# Patient Record
Sex: MALE | Race: WHITE | NOT HISPANIC OR LATINO | Employment: OTHER | URBAN - METROPOLITAN AREA
[De-identification: names, ages, dates, MRNs, and addresses within clinical notes are randomized per-mention and may not be internally consistent; named-entity substitution may affect disease eponyms.]

---

## 2017-01-31 ENCOUNTER — PREPPED CHART (OUTPATIENT)
Dept: URBAN - METROPOLITAN AREA CLINIC 43 | Facility: CLINIC | Age: 82
End: 2017-01-31

## 2018-04-20 ENCOUNTER — ESTABLISHED COMPREHENSIVE EXAM (OUTPATIENT)
Dept: URBAN - METROPOLITAN AREA CLINIC 43 | Facility: CLINIC | Age: 83
End: 2018-04-20

## 2018-04-20 DIAGNOSIS — H35.372: ICD-10-CM

## 2018-04-20 DIAGNOSIS — H04.123: ICD-10-CM

## 2018-04-20 DIAGNOSIS — H43.813: ICD-10-CM

## 2018-04-20 PROCEDURE — 1036F TOBACCO NON-USER: CPT

## 2018-04-20 PROCEDURE — G8428 CUR MEDS NOT DOCUMENT: HCPCS

## 2018-04-20 PROCEDURE — 92015 DETERMINE REFRACTIVE STATE: CPT

## 2018-04-20 PROCEDURE — 92014 COMPRE OPH EXAM EST PT 1/>: CPT

## 2018-04-20 PROCEDURE — G8756 NO BP MEASURE DOC: HCPCS

## 2018-04-20 ASSESSMENT — VISUAL ACUITY
OD_SC: J3
OS_CC: 20/30-2
OS_CC: J1
OS_SC: 20/60
OD_SC: 20/50
OD_BAT: 20/80
OD_CC: J2
OD_CC: 20/30-1
OS_SC: J3
OS_BAT: 20/80

## 2018-04-20 ASSESSMENT — TONOMETRY
OD_IOP_MMHG: 15
OS_IOP_MMHG: 16

## 2018-11-05 NOTE — PATIENT DISCUSSION
CATARACT, OU -  QUESTION VISUALLY SIGNIFICANT OD&gt; OS. VA LIKELY ALSO LIMITED DUE TO THE RETINA. WILL REFER TO DR Bhavik Watts FOR FURTHER EVAL OF RETINA AND TO CLEAR FOR PHACO IF CAT SX WILL IMPROVE HER VISION.

## 2018-11-05 NOTE — PATIENT DISCUSSION
ERM, OD - CONSULT DR. INIGUEZ FOR FURTHER EVAL AND POSSIBLE TREATMENT. ALSO CLEAR FOR PHACO AND ADRESS IF REMOVING THE CATARACT WOULD BENEFIT HER VISION.

## 2018-11-15 NOTE — PATIENT DISCUSSION
New Prescription: Pred Forte (prednisolone acetate): drops,suspension: 1% 1 drop four times a day into left eye 11-

## 2018-11-15 NOTE — PATIENT DISCUSSION
New Prescription: atropine (atropine): drops: 1% 1 drop twice a day as directed into affected eye 11-

## 2018-11-15 NOTE — PATIENT DISCUSSION
RETINA IS ATTACHED OU: PVD OD; SYNERESIS OS; NO HOLES OR TEARS SEEN ON DILATED EXAM TODAY.  RETINAL DETACHMENT SIGNS AND SYMPTOMS REVIEWED

## 2018-11-15 NOTE — PATIENT DISCUSSION
New Prescription: ciprofloxacin (ciprofloxacin): drops: 0.3% 1 drop four times a day as directed into affected eye 11-

## 2018-12-12 NOTE — PATIENT DISCUSSION
Continue: Pred Forte (prednisolone acetate): drops,suspension: 1% 1 drop four times a day into left eye 11-

## 2018-12-20 NOTE — PATIENT DISCUSSION
REVIEWED PVD PRECAUTIONS WITH PATIENT AND ADVISED TO CALL IF EXPERIENCES NEW FLASHES OF LIGHT, INCREASE IN FLOATERS, OR DECREASE VISION IN EITHER EYE

## 2018-12-20 NOTE — PATIENT DISCUSSION
POST-OP WEEK 1 EXAM: S/P PPV/MP OD. Doing well today. Retina attached. IOP within acceptable limits. Continue eyedrops in the surgical eye as instructed. Begin Pred taper. Discontinue Cipro and Atropine. Retinal detachment and endophthalmitis precautions reviewed. Instructed to call immediately for worsening vision, eye pain, or eye discharge.

## 2018-12-20 NOTE — PATIENT DISCUSSION
Continue: Pred Forte (prednisolone acetate): drops,suspension: 1% 1 drop three times a day into left eye 11-

## 2019-01-10 NOTE — PATIENT DISCUSSION
POST-OP MONTH 1 EXAM: S/P PPV/MP OD. Doing well today. Retina attached. IOP within acceptable limits. Finish eyedrops in the surgical eye as instructed. Retinal detachment and endophthalmitis precautions reviewed. Instructed to call immediately for worsening vision, eye pain, or eye discharge.

## 2019-06-20 NOTE — PATIENT DISCUSSION
RETINA IS ATTACHED OU: S/P PPV/PEEL OD; NO HOLES OR TEARS SEEN ON DILATED EXAM TODAY.  RETINAL DETACHMENT SIGNS AND SYMPTOMS REVIEWED

## 2019-07-26 NOTE — PATIENT DISCUSSION
CATARACT, OU - VISUALLY SIGNIFICANT OD &gt; OS. SCHEDULE SX OD.  WILL HOLD OFF ON CATARACT SURGERY IN THE OS FOR NOW

## 2019-07-26 NOTE — PATIENT DISCUSSION
PATIENT UNDERSTANDS THAT HER VISION WILL ALWAYS BE LIMITED BY HER RETINA OD. PATIENT UNDERSTANDS INCREASED RISKS WITH CATARACT SURGERY DUE TO PREVIOUS RETINAL SURGERY.

## 2019-07-26 NOTE — PATIENT DISCUSSION
Surgery  Counseling: I have discussed the option of glasses versus cataract surgery versus following. It was explained that when vision no longer meets the patient's visual needs and a new prescription for glasses is not likely to improve the patient's visual symptoms, the option of cataract surgery is a reasonable next step. It was explained that there is no guarantee that removing the cataract will improve their visual symptoms, however; it is believed that the cataract is contributing to the patient's visual impairment and surgery may significantly improve both the visual and functional status of the patient. The risks, benefits and alternatives of surgery were discussed with the patient. After this discussion, the patient desires to proceed with cataract surgery with implantation of an intraocular lens to improve vision and reduce glare in the mornings.

## 2019-07-26 NOTE — PATIENT DISCUSSION
New Prescription: prednisolone acetate (prednisolone acetate): drops,suspension: 1% 1 drop four times a day as directed 07-

## 2019-09-12 NOTE — PATIENT DISCUSSION
S/P PCIOL, OD- STABLE, DOING WELL. OK TO PROCEED WITH FELLOW EYE SURGERY. PT REPORTS THAT THEY ARE HAPPY WITH THE OUTCOME OF THE OPERATIVE EYE AND READY TO PURSUE SX IN THE FELLOW EYE.

## 2019-09-12 NOTE — PATIENT DISCUSSION
Continue: prednisolone acetate (prednisolone acetate): drops,suspension: 1% 1 drop four times a day as directed 07-

## 2019-09-12 NOTE — PATIENT DISCUSSION
Surgery  Counseling: I have discussed the option of glasses versus cataract surgery versus following . It was explained that when vision no longer meets the patient's visual needs and a new prescription for glasses is not likely to improve all of the patient's visual symptoms, the option of cataract surgery is a reasonable next step. It was explained that there is no guarantee that removing the cataract will improve their visual symptoms, however; it is believed that the cataract is contributing to the patient's visual impairment and surgery may significantly improve both the visual and functional status of the patient. The risks, benefits and alternatives of surgery were discussed with the patient. After this discussion, the patient desires to proceed with cataract surgery with implantation of an intraocular lens to improve vision for glare and reading fine print.

## 2021-10-04 ENCOUNTER — NEW PATIENT EMERGENCY (OUTPATIENT)
Dept: URBAN - METROPOLITAN AREA CLINIC 43 | Facility: CLINIC | Age: 86
End: 2021-10-04

## 2021-10-04 DIAGNOSIS — H11.32: ICD-10-CM

## 2021-10-04 PROCEDURE — 92002 INTRM OPH EXAM NEW PATIENT: CPT

## 2021-10-04 ASSESSMENT — VISUAL ACUITY
OS_CC: 20/25-1
OD_CC: 20/30-2

## 2021-10-04 ASSESSMENT — TONOMETRY
OS_IOP_MMHG: 13
OD_IOP_MMHG: 14

## 2022-01-07 NOTE — PATIENT DISCUSSION
Emergency Department TeleTriage Encounter Note      CHIEF COMPLAINT    Chief Complaint   Patient presents with    Hypertension     His blood pressure was 205/132 at home       VITAL SIGNS   Initial Vitals [01/06/22 2236]   BP Pulse Resp Temp SpO2   (!) 229/160 (!) 121 18 97.8 °F (36.6 °C) 100 %      MAP       --            ALLERGIES    Review of patient's allergies indicates:  No Known Allergies    PROVIDER TRIAGE NOTE  TeleTriage Note: Charly Pires, a nontoxic/well appearing, 37 y.o. male, presented to the ED with c/o elevated blood pressure reading today. Pt states he felt like he was having an anxiety attack and took his BP and it was elevated. Took BP meds twice today (normally takes them once). Last time was 30 minutes ago. Denies chest pain, blurry vision or headaches.     All ED beds are full at present; patient notified of this status.  Patient seen and medically screened by Nurse Practitioner via teletriage. Orders initiated at triage to expedite care.  Patient is stable to return to the waiting room and will be placed in an ED bed when available.  Care will be transferred to an alternate provider when patient has been placed in an Exam Room from the [facility] for physical exam, additional orders, and disposition.  10:46 PM Gloria Lazcano, GEMA, FNP-C        ORDERS  Labs Reviewed   CBC W/ AUTO DIFFERENTIAL   COMPREHENSIVE METABOLIC PANEL   TROPONIN I   B-TYPE NATRIURETIC PEPTIDE   URINALYSIS, REFLEX TO URINE CULTURE       ED Orders (720h ago, onward)    Start Ordered     Status Ordering Provider    01/06/22 2248 01/06/22 2247  Insert peripheral IV  Continuous         Ordered GLORIA LAZCANO    01/06/22 2248 01/06/22 2247  CBC auto differential  STAT         Ordered GLORIA LAZCANO    01/06/22 2248 01/06/22 2247  Comprehensive metabolic panel  STAT         Ordered GLORIA LAZCANO    01/06/22 2248 01/06/22 2247  Troponin I  STAT         Ordered GLORIA LAZCANO    01/06/22 2248 01/06/22 2247   POST-OP DAY 1 EXAM: S/P PPV/MP OD. Doing well today. Retina attached. IOP within acceptable limits. Start eyedrops in the surgical eye as instructed: Pred 4x/day, Cipro 4x/day, Atropine 2x/day. Take tylenol or ibuprofen for any mild eye pain or pressure. Retinal detachment and endophthalmitis precautions reviewed. Instructed to call immediately for worsening vision, eye pain, or eye discharge. Brain natriuretic peptide  STAT         Ordered EDYTERESA.    01/06/22 2248 01/06/22 2247  EKG 12-lead  Once         Ordered EDYTERESA.    01/06/22 2248 01/06/22 2247  Urinalysis, Reflex to Urine Culture Urine, Clean Catch  STAT         Ordered EDYTERESA SHORE.    01/06/22 2239 01/06/22 2239  EKG 12-lead  Once         Completed by JOELLEN RICHMOND on 1/6/2022 at 10:44 PM LISSETTE FRANKS            Virtual Visit Note: The provider triage portion of this emergency department evaluation and documentation was performed via [employer], a HIPAA-compliant telemedicine application, in concert with a tele-presenter in the room. A face to face patient evaluation with one of my colleagues will occur once the patient is placed in an emergency department room.      DISCLAIMER: This note was prepared with Figure 1 voice recognition transcription software. Garbled syntax, mangled pronouns, and other bizarre constructions may be attributed to that software system.

## 2022-01-25 ENCOUNTER — EMERGENCY VISIT (OUTPATIENT)
Dept: URBAN - METROPOLITAN AREA CLINIC 43 | Facility: CLINIC | Age: 87
End: 2022-01-25

## 2022-01-25 DIAGNOSIS — H11.31: ICD-10-CM

## 2022-01-25 PROCEDURE — 92012 INTRM OPH EXAM EST PATIENT: CPT

## 2022-01-25 ASSESSMENT — VISUAL ACUITY
OD_SC: 20/200
OS_SC: 20/50+1
OD_CC: 20/30-1
OS_CC: 20/25-2
OD_PH: 20/40+1
OS_PH: 20/40

## 2022-01-25 ASSESSMENT — TONOMETRY
OD_IOP_MMHG: 13
OS_IOP_MMHG: 12

## 2022-07-11 NOTE — PATIENT DISCUSSION
Posterior capsular opacity accounts for the patient's complaints and is interfering with activities of daily living. Discussed risks and benefits of YAG Laser Capsulotomy. Patient wishes to proceed. Schedule YAG Laser Capsulotomy in the right eye. Hld off on yag in the left eye for now.

## 2023-05-19 ENCOUNTER — COMPREHENSIVE EXAM (OUTPATIENT)
Dept: URBAN - METROPOLITAN AREA CLINIC 43 | Facility: CLINIC | Age: 88
End: 2023-05-19

## 2023-05-19 DIAGNOSIS — H04.123: ICD-10-CM

## 2023-05-19 DIAGNOSIS — H35.372: ICD-10-CM

## 2023-05-19 DIAGNOSIS — H43.813: ICD-10-CM

## 2023-05-19 DIAGNOSIS — H26.493: ICD-10-CM

## 2023-05-19 DIAGNOSIS — H52.203: ICD-10-CM

## 2023-05-19 PROCEDURE — 92014 COMPRE OPH EXAM EST PT 1/>: CPT

## 2023-05-19 PROCEDURE — 92015 DETERMINE REFRACTIVE STATE: CPT

## 2023-05-19 ASSESSMENT — TONOMETRY
OD_IOP_MMHG: 14
OS_IOP_MMHG: 14

## 2023-05-19 ASSESSMENT — VISUAL ACUITY
OD_CC: 20/30
OU_SC: J2
OD_SC: 20/70-2
OS_SC: J8
OS_SC: 20/60
OD_SC: J4
OS_CC: J1
OS_CC: 20/20
OU_SC: 20/40-2
OU_CC: 20/20
OU_CC: J1
OD_CC: J1